# Patient Record
Sex: FEMALE | Race: WHITE | NOT HISPANIC OR LATINO | Employment: OTHER | URBAN - METROPOLITAN AREA
[De-identification: names, ages, dates, MRNs, and addresses within clinical notes are randomized per-mention and may not be internally consistent; named-entity substitution may affect disease eponyms.]

---

## 2017-01-20 ENCOUNTER — HOSPITAL ENCOUNTER (OUTPATIENT)
Dept: RADIOLOGY | Facility: HOSPITAL | Age: 62
Discharge: HOME | End: 2017-01-20
Attending: THORACIC SURGERY (CARDIOTHORACIC VASCULAR SURGERY)
Payer: COMMERCIAL

## 2017-01-20 ENCOUNTER — OFFICE VISIT (OUTPATIENT)
Dept: CARDIOTHORACIC SURGERY | Facility: CLINIC | Age: 62
End: 2017-01-20
Payer: COMMERCIAL

## 2017-01-20 VITALS
WEIGHT: 138.69 LBS | HEART RATE: 120 BPM | BODY MASS INDEX: 25.52 KG/M2 | SYSTOLIC BLOOD PRESSURE: 134 MMHG | DIASTOLIC BLOOD PRESSURE: 78 MMHG | OXYGEN SATURATION: 97 % | HEIGHT: 62 IN

## 2017-01-20 DIAGNOSIS — J90 PLEURAL EFFUSION: ICD-10-CM

## 2017-01-20 DIAGNOSIS — J90 PLEURAL EFFUSION ON RIGHT: Primary | ICD-10-CM

## 2017-01-20 PROCEDURE — 71020 XR CHEST PA AND LATERAL: CPT | Mod: TC

## 2017-01-20 PROCEDURE — 71020 XR CHEST PA AND LATERAL: CPT | Mod: 26,,, | Performed by: RADIOLOGY

## 2017-01-20 PROCEDURE — 3075F SYST BP GE 130 - 139MM HG: CPT | Mod: S$GLB,,, | Performed by: THORACIC SURGERY (CARDIOTHORACIC VASCULAR SURGERY)

## 2017-01-20 PROCEDURE — 99213 OFFICE O/P EST LOW 20 MIN: CPT | Mod: S$GLB,,, | Performed by: THORACIC SURGERY (CARDIOTHORACIC VASCULAR SURGERY)

## 2017-01-20 PROCEDURE — 3078F DIAST BP <80 MM HG: CPT | Mod: S$GLB,,, | Performed by: THORACIC SURGERY (CARDIOTHORACIC VASCULAR SURGERY)

## 2017-01-20 PROCEDURE — 1159F MED LIST DOCD IN RCRD: CPT | Mod: S$GLB,,, | Performed by: THORACIC SURGERY (CARDIOTHORACIC VASCULAR SURGERY)

## 2017-01-20 PROCEDURE — 99999 PR PBB SHADOW E&M-EST. PATIENT-LVL III: CPT | Mod: PBBFAC,,, | Performed by: THORACIC SURGERY (CARDIOTHORACIC VASCULAR SURGERY)

## 2017-01-20 NOTE — PROGRESS NOTES
GENERAL SURGERY CLINIC NOTE    Rehana Rushing is a 62 y.o.  female with Hx of ovarian cancer who presents to clinic for follow up 1 month after pleurx catheter removal.  She denies any SOB but does complain of abdominal pain/distention.  She has difficulty eating and has had some nausea/vomiting. She follows up with Dr Weiss in mid February for her ovarian cancer.        ROS:  A 10-point review of systems is negative except for the above mentioned in the HPI.      Past Medical History   Diagnosis Date    Acute constipation 8/13/2015    Chemotherapy induced neutropenia 9/4/2015    Chemotherapy induced thrombocytopenia 9/4/2015    Cough 10/21/2016    Gastroesophageal reflux disease without esophagitis 2/4/2016    Hepatic cyst 10/21/2016    History of anemia 2008    History of blood transfusion 2008    History of ovarian cancer March 2008     s/p total hyst, chemo    Hypertension     Ovarian cancer 7/14/2015    Pleural effusion, right 10/21/2016       Past Surgical History   Procedure Laterality Date    Tonsillectomy      Tubal ligation       chemo therapy cancer      Hysterectomy  March 2008    Pr removal of ovary/tube(s)  March 2008    Pelvic and para-aortic lymph node dissection  March 2008       Social History     Social History    Marital status:      Spouse name: N/A    Number of children: 2    Years of education: N/A     Occupational History     [employer]     Social History Main Topics    Smoking status: Never Smoker    Smokeless tobacco: Not on file    Alcohol use No    Drug use: No    Sexual activity: Yes     Partners: Male     Other Topics Concern    Not on file     Social History Narrative       Review of patient's allergies indicates:   Allergen Reactions    Carboplatin Itching     Mouth swelling   Hand swelling         PHYSICAL EXAM:  Vitals:    01/20/17 0929   BP: 134/78   Pulse: (!) 120       General: NAD  Neuro:  AAOx3  Cardio: S1 and S2, RRR  Resp: CTAB, breathing even and unlabored  Abd: Soft, distended, non-tender, +BS  Ext: Warm and well perfused      PERTINENT IMAGING:  CXR reviewed     ASSESSMENT/PLAN:  62 y.o. female with hx of ovarian cancer and recurrent pleural effusion s/p pleurx placement and pleurx removal 1 month ago    - Will message Dr Weiss that she may need to be seen sooner for possible paracentesis  - Follow up PRGISELLA Pugh M.D.  General Surgery PGY1  [phone]      ATTENDING ATTESTATION:    I evaluated the patient and I agree with the assessment and plan

## 2017-01-27 ENCOUNTER — OFFICE VISIT (OUTPATIENT)
Dept: GYNECOLOGIC ONCOLOGY | Facility: CLINIC | Age: 62
End: 2017-01-27
Payer: COMMERCIAL

## 2017-01-27 ENCOUNTER — LAB VISIT (OUTPATIENT)
Dept: LAB | Facility: HOSPITAL | Age: 62
End: 2017-01-27
Attending: OBSTETRICS & GYNECOLOGY
Payer: COMMERCIAL

## 2017-01-27 VITALS
SYSTOLIC BLOOD PRESSURE: 127 MMHG | WEIGHT: 136 LBS | BODY MASS INDEX: 24.87 KG/M2 | DIASTOLIC BLOOD PRESSURE: 69 MMHG | HEART RATE: 102 BPM

## 2017-01-27 DIAGNOSIS — C56.9 OVARIAN CANCER, UNSPECIFIED LATERALITY: Primary | ICD-10-CM

## 2017-01-27 DIAGNOSIS — R18.0 ASCITES, MALIGNANT: ICD-10-CM

## 2017-01-27 DIAGNOSIS — C56.9 OVARIAN CANCER, UNSPECIFIED LATERALITY: ICD-10-CM

## 2017-01-27 LAB
ANION GAP SERPL CALC-SCNC: 10 MMOL/L
BUN SERPL-MCNC: 13 MG/DL
CALCIUM SERPL-MCNC: 8.9 MG/DL
CANCER AG125 SERPL-ACNC: 1613 U/ML
CHLORIDE SERPL-SCNC: 103 MMOL/L
CO2 SERPL-SCNC: 23 MMOL/L
CREAT SERPL-MCNC: 0.9 MG/DL
EST. GFR  (AFRICAN AMERICAN): >60 ML/MIN/1.73 M^2
EST. GFR  (NON AFRICAN AMERICAN): >60 ML/MIN/1.73 M^2
GLUCOSE SERPL-MCNC: 142 MG/DL
POTASSIUM SERPL-SCNC: 4.7 MMOL/L
SODIUM SERPL-SCNC: 136 MMOL/L

## 2017-01-27 PROCEDURE — 1159F MED LIST DOCD IN RCRD: CPT | Mod: S$GLB,,, | Performed by: OBSTETRICS & GYNECOLOGY

## 2017-01-27 PROCEDURE — 36415 COLL VENOUS BLD VENIPUNCTURE: CPT

## 2017-01-27 PROCEDURE — 80048 BASIC METABOLIC PNL TOTAL CA: CPT

## 2017-01-27 PROCEDURE — 3074F SYST BP LT 130 MM HG: CPT | Mod: S$GLB,,, | Performed by: OBSTETRICS & GYNECOLOGY

## 2017-01-27 PROCEDURE — 99215 OFFICE O/P EST HI 40 MIN: CPT | Mod: S$GLB,,, | Performed by: OBSTETRICS & GYNECOLOGY

## 2017-01-27 PROCEDURE — 86304 IMMUNOASSAY TUMOR CA 125: CPT

## 2017-01-27 PROCEDURE — 99999 PR PBB SHADOW E&M-EST. PATIENT-LVL III: CPT | Mod: PBBFAC,,, | Performed by: OBSTETRICS & GYNECOLOGY

## 2017-01-27 PROCEDURE — 3078F DIAST BP <80 MM HG: CPT | Mod: S$GLB,,, | Performed by: OBSTETRICS & GYNECOLOGY

## 2017-01-27 NOTE — MR AVS SNAPSHOT
[facility] - GYN Oncology  [address]  [facility] [address]  Phone: [phone]                  Rehana Rushing   2017 2:00 PM   Office Visit    Description:  Female : 1955   Provider:  Nathaniel Weiss MD   Department:  [facility] - GYN Oncology           Reason for Visit     Ovarian Cancer           Diagnoses this Visit        Comments    Ovarian cancer, unspecified laterality    -  Primary     Ascites, malignant                To Do List           Goals (5 Years of Data)     None      [facility] On Call     [facility] On Call Nurse Care Line -  Assistance  Registered nurses in the [facility] On Call Center provide clinical advisement, health education, appointment booking, and other advisory services.  Call for this free service at [phone].             Medications           Message regarding Medications     Verify the changes and/or additions to your medication regime listed below are the same as discussed with your clinician today.  If any of these changes or additions are incorrect, please notify your healthcare provider.        STOP taking these medications     oxycodone-acetaminophen (PERCOCET) 5-325 mg per tablet Take 1 tablet by mouth every 4 (four) hours as needed for Pain.           Verify that the below list of medications is an accurate representation of the medications you are currently taking.  If none reported, the list may be blank. If incorrect, please contact your healthcare provider. Carry this list with you in case of emergency.           Current Medications     acetaminophen (TYLENOL) 325 MG tablet Take 2 tablets (650 mg total) by mouth every 4 (four) hours as needed for Pain (pain).    amlodipine (NORVASC) 5 MG tablet Take 1 tablet (5 mg total) by mouth once daily.    calcium carbonate 220 mg capsule Take by mouth with lunch.     chlorpheniramine (CHLOR-TRIMETON) 4 mg tablet Take 4 mg by mouth. Take 3 tablets day after chemo    FERROUS SULFATE, DRIED (IRON, DRIED,  ORAL) Take 1 tablet by mouth every morning.     GLUTAMINE ORAL Take 2 capsules by mouth 2 (two) times daily. With chemo    losartan (COZAAR) 50 MG tablet Take 1 tablet (50 mg total) by mouth once daily.    multivitamin capsule Take 1 capsule by mouth with lunch.     polyethylene glycol (GLYCOLAX) 17 gram/dose powder Take 17 g by mouth once daily.    tamoxifen (NOLVADEX) 20 MG Tab Take 1 tablet (20 mg total) by mouth once daily.           Clinical Reference Information           Vital Signs - Last Recorded  Most recent update: 1/27/2017  1:38 PM by Jose Francisco Menjivar MA    BP Pulse Wt BMI       127/69 102 61.7 kg (136 lb) 24.87 kg/m2       Blood Pressure          Most Recent Value    BP  127/69      Allergies as of 1/27/2017     Carboplatin      Immunizations Administered on Date of Encounter - 1/27/2017     None      Orders Placed During Today's Visit     Future Labs/Procedures Expected by Expires    Basic metabolic panel  1/27/2017 1/27/2018    CT Abdomen Pelvis With Contrast  1/27/2017 1/27/2018

## 2017-01-27 NOTE — PROGRESS NOTES
Subjective:       Patient ID: Rehana Rushing is a 62 y.o. female.    Chief Complaint: Ovarian Cancer (2mth f/u)    HPI   Patient comes in today for follow up for recurrent ovarian cancer.   She is s/p VATS procedure and placement of Pleur-X catheter Nov 28/2016. Catheter was removed 1 month later with CXR showing only a small right pleural effusion.     She complains of increasing abdominal distension for 1 week.   Had nausea and vomiting last week.   Decreased appetite.      Her oncologic history is:    She was optimally debulked with no gross residual disease. She completed 6    cycles of IV Taxol and carboplatinum in 7/2008.    June 2015 at time of WWE her  was 820. Repeat was 839. CT scan done that showed no evidence for disease.    PET scan showed:    Peritoneal implants over the dome of the liver and posterior to the liver.  Metastatic right diaphragmatic lymph node.      Started Taxol and carboplatin in July 2015.    She had a reaction with carboplatin at the end of her 3rd treatment. Itching hands and swollen lip. Given solu-cortef and resolved.    With cycle 4 she was given additional solu-cortef pre infusion and had similar reaction near the end of infusion. She was given additional solu-cortef and redness of palms of hands and itching resolved.     has gone from 839 to 92 after 3 cycles.    Cycle 5 was cancelled.    PET scan done after 5 cycles of reinduction chemotherapy and this showed    There is physiologic intracranial, head, and neck activity. Heart and mediastinum are normal. The peritoneal implants have resolved. There is a large cystic lesion of the right lobe of the liver. There is physiologic GI and  activity. There is no evidence of peritoneal spread. There is no evidence of active malignancy. Lungs are clear.          Ca 125 after 5 cycles was 72.    At time of cycle 6, her  was 78. Cycle 6 was not given because of a carboplatin allergy. Chemotherapy was discontinued at  this time.    When seen Jan 26, 2015 her  had increased to 349.    PET scan obtained in Jan 2016 showed:    Peritoneal spread of neoplasm around the right lobe of the liver and within the pelvis.  Right pleural metastases.    She started gemcitabine and bevacizumab in Feb 2016. She did not receive bevacizumab with cycle 1 because of her BP. She was started on Norvasc. Her BP was inconsistent and she saw her PCP who added Cozaar. Her BPs at home have been much better.            After 1 cycle of Gemcitabine and 4 cycles of Harding and Avastin, her  has increased from 626 to 697.    At time of starting gem/avastin her  was 459.      CT scan in June 2016 showed:  Stable soft tissue thickening along the posterior right hepatic lobe consistent with patient's known history of peritoneal metastasis.  No new peritoneal metastases.      Because of rising  she was switched to Doxil. Started Doxil on July 1, 2016.  was 697.                  Review of Systems   Constitutional: Positive for fatigue. Negative for chills and fever.   Respiratory: Negative for cough, shortness of breath and wheezing.    Cardiovascular: Negative for chest pain, palpitations and leg swelling.   Gastrointestinal: Positive for abdominal distention, abdominal pain and vomiting. Negative for constipation, diarrhea and nausea.   Genitourinary: Negative for difficulty urinating, dysuria, frequency, genital sores, hematuria, urgency, vaginal bleeding, vaginal discharge and vaginal pain.   Musculoskeletal: Positive for back pain.   Neurological: Negative for weakness.   Hematological: Negative for adenopathy. Does not bruise/bleed easily.   Psychiatric/Behavioral: The patient is not nervous/anxious.        Objective:       Visit Vitals    /69    Pulse 102    Wt 61.7 kg (136 lb)    BMI 24.87 kg/m2       Physical Exam   Constitutional: She is oriented to person, place, and time. She appears well-developed and well-nourished.    HENT:   Head: Normocephalic and atraumatic.   Eyes: No scleral icterus.   Neck: Neck supple. No tracheal deviation present. No thyroid mass and no thyromegaly present.   Cardiovascular: Normal rate and regular rhythm.    Pulmonary/Chest: Effort normal and breath sounds normal. She has no wheezes.   Abdominal: Soft. She exhibits distension (due to ascites. ). She exhibits no mass. There is no hepatosplenomegaly. There is no tenderness. There is no rebound and no guarding.   Genitourinary:   Genitourinary Comments: Bimanual exam:  Vulva: no lesions. Normal appearance  Urethra: Normal size and location. No lesions  Bladder: No masses or tenderness.  Vagina: normal mucosa. No lesion  Cervix: absent.   Uterus: absent.  Adnexa: no masses.  Rectovaginal: Not performed     Musculoskeletal: She exhibits no edema or tenderness.   Lymphadenopathy:     She has no cervical adenopathy.     She has no axillary adenopathy.        Right: No inguinal and no supraclavicular adenopathy present.        Left: No inguinal and no supraclavicular adenopathy present.   Neurological: She is alert and oriented to person, place, and time.   Skin: Skin is warm and dry.   Psychiatric: She has a normal mood and affect. Her behavior is normal. Judgment and thought content normal.       Assessment:       1. Ovarian cancer, unspecified laterality    2. Ascites, malignant        Plan:   Ovarian cancer, unspecified laterality  Will get CT scan to see if patient has measurable disease.   She may be eligible for a clinical trial.     I discussed clinical trial with her. She is not certain she would want to do this.   I offered single agent weekly taxol as an alternative.     I also offer paracentesis but explained that the ascites will reaccumulate within a short period of time.     I also discussed with her and her significant other the option of palliative care.        -     Basic metabolic panel; Future; Expected date: 1/27/17  -     CT Abdomen  Pelvis With Contrast; Future; Expected date: 1/27/17    Ascites, malignant

## 2017-01-31 ENCOUNTER — HOSPITAL ENCOUNTER (OUTPATIENT)
Dept: RADIOLOGY | Facility: HOSPITAL | Age: 62
Discharge: HOME | End: 2017-01-31
Attending: OBSTETRICS & GYNECOLOGY
Payer: COMMERCIAL

## 2017-01-31 DIAGNOSIS — C56.9 OVARIAN CANCER, UNSPECIFIED LATERALITY: ICD-10-CM

## 2017-01-31 PROCEDURE — 74177 CT ABD & PELVIS W/CONTRAST: CPT | Mod: 26,,, | Performed by: RADIOLOGY

## 2017-01-31 PROCEDURE — 25500020 PHARM REV CODE 255: Performed by: OBSTETRICS & GYNECOLOGY

## 2017-01-31 PROCEDURE — 74177 CT ABD & PELVIS W/CONTRAST: CPT | Mod: TC

## 2017-01-31 RX ADMIN — IOHEXOL 75 ML: 350 INJECTION, SOLUTION INTRAVENOUS at 07:01

## 2017-02-02 ENCOUNTER — TELEPHONE (OUTPATIENT)
Dept: GYNECOLOGIC ONCOLOGY | Facility: CLINIC | Age: 62
End: 2017-02-02

## 2017-02-02 NOTE — TELEPHONE ENCOUNTER
----- Message from Nathaniel Weiss MD sent at 2/2/2017  3:15 PM CST -----  I discussed CT scan findings with her.   I discussed with her weekly taxol vs palliative care.   She does not think she wants chemotherapy. She will talk with her significant other about what she wants to do.     I also offered a peritoneal catheter placement for palliation of her abdominal discomfort from ascites.     She will get back in touch with me.

## 2017-02-08 ENCOUNTER — TELEPHONE (OUTPATIENT)
Dept: INTERVENTIONAL RADIOLOGY/VASCULAR | Facility: HOSPITAL | Age: 62
End: 2017-02-08

## 2017-02-08 ENCOUNTER — DOCUMENTATION ONLY (OUTPATIENT)
Dept: HEMATOLOGY/ONCOLOGY | Facility: CLINIC | Age: 62
End: 2017-02-08

## 2017-02-08 DIAGNOSIS — C56.9 OVARIAN CANCER, UNSPECIFIED LATERALITY: Primary | ICD-10-CM

## 2017-02-08 NOTE — PROGRESS NOTES
Received referral from the clinic that patient is in need of hospice services. Patient to have drain placed tomorrow. Contacted patient and discussed services with her. She has very good support at home and believes she can be managed at home. Reviewed in network providers with her and she did not stated preference. Referral made to [facility].

## 2017-02-09 ENCOUNTER — HOSPITAL ENCOUNTER (OUTPATIENT)
Facility: HOSPITAL | Age: 62
Discharge: HOME | End: 2017-02-09
Attending: OBSTETRICS & GYNECOLOGY | Admitting: OBSTETRICS & GYNECOLOGY
Payer: COMMERCIAL

## 2017-02-09 VITALS
RESPIRATION RATE: 18 BRPM | OXYGEN SATURATION: 100 % | HEART RATE: 97 BPM | DIASTOLIC BLOOD PRESSURE: 80 MMHG | TEMPERATURE: 98 F | HEIGHT: 62 IN | BODY MASS INDEX: 25.03 KG/M2 | WEIGHT: 136 LBS | SYSTOLIC BLOOD PRESSURE: 127 MMHG

## 2017-02-09 DIAGNOSIS — C56.9 OVARIAN CANCER, UNSPECIFIED LATERALITY: ICD-10-CM

## 2017-02-09 LAB
INR PPP: 1.3
PROTHROMBIN TIME: 13 SEC

## 2017-02-09 PROCEDURE — 25000003 PHARM REV CODE 250: Performed by: OBSTETRICS & GYNECOLOGY

## 2017-02-09 PROCEDURE — 85610 PROTHROMBIN TIME: CPT

## 2017-02-09 PROCEDURE — 63600175 PHARM REV CODE 636 W HCPCS: Performed by: RADIOLOGY

## 2017-02-09 RX ORDER — SODIUM CHLORIDE 9 MG/ML
INJECTION, SOLUTION INTRAVENOUS CONTINUOUS
Status: DISCONTINUED | OUTPATIENT
Start: 2017-02-09 | End: 2017-02-10 | Stop reason: HOSPADM

## 2017-02-09 RX ORDER — MIDAZOLAM HYDROCHLORIDE 1 MG/ML
INJECTION, SOLUTION INTRAMUSCULAR; INTRAVENOUS CODE/TRAUMA/SEDATION MEDICATION
Status: COMPLETED | OUTPATIENT
Start: 2017-02-09 | End: 2017-02-09

## 2017-02-09 RX ORDER — FENTANYL CITRATE 50 UG/ML
INJECTION, SOLUTION INTRAMUSCULAR; INTRAVENOUS CODE/TRAUMA/SEDATION MEDICATION
Status: COMPLETED | OUTPATIENT
Start: 2017-02-09 | End: 2017-02-09

## 2017-02-09 RX ORDER — LIDOCAINE HYDROCHLORIDE 10 MG/ML
1 INJECTION, SOLUTION EPIDURAL; INFILTRATION; INTRACAUDAL; PERINEURAL ONCE
Status: COMPLETED | OUTPATIENT
Start: 2017-02-09 | End: 2017-02-09

## 2017-02-09 RX ADMIN — MIDAZOLAM HYDROCHLORIDE 0.5 MG: 1 INJECTION, SOLUTION INTRAMUSCULAR; INTRAVENOUS at 03:02

## 2017-02-09 RX ADMIN — FENTANYL CITRATE 25 MCG: 50 INJECTION, SOLUTION INTRAMUSCULAR; INTRAVENOUS at 03:02

## 2017-02-09 RX ADMIN — SODIUM CHLORIDE: 0.9 INJECTION, SOLUTION INTRAVENOUS at 01:02

## 2017-02-09 RX ADMIN — LIDOCAINE HYDROCHLORIDE 0.2 MG: 10 INJECTION, SOLUTION EPIDURAL; INFILTRATION; INTRACAUDAL; PERINEURAL at 01:02

## 2017-02-09 NOTE — PROCEDURES
Radiology Post-Procedure Note    Pre Op Diagnosis: Ovarian cancer with malignant ascites.  Post Op Diagnosis: Same.    Procedure performed by: staff Dr. Darden and resident Dr. Chong.    Written Informed Consent Obtained: Yes  Specimen Removed: YES   Estimated Blood Loss: Minimal    Procedure/Findings:   Image guided tunneled peritoneal pleurx catheter placement (15.5 fr).  Please refer to the procedure dictation for more details.    Patient tolerated procedure well.    Lana Chong  PGY-3  Department of Radiology  [phone]

## 2017-02-09 NOTE — H&P
Radiology History & Physical      SUBJECTIVE:     Chief Complaint: Ovarian ca.    History of Present Illness:  Rehana Rushing is a 62 y.o. female who presents for image guided tunneled peritoneal pleurx catheter placement for malignant ascites.    Past Medical History   Diagnosis Date    Acute constipation 8/13/2015    Ascites, malignant 1/27/2017    Chemotherapy induced neutropenia 9/4/2015    Chemotherapy induced thrombocytopenia 9/4/2015    Cough 10/21/2016    Gastroesophageal reflux disease without esophagitis 2/4/2016    Hepatic cyst 10/21/2016    History of anemia 2008    History of blood transfusion 2008    History of ovarian cancer March 2008     s/p total hyst, chemo    Hypertension     Ovarian cancer 7/14/2015    Pleural effusion, right 10/21/2016     Past Surgical History   Procedure Laterality Date    Tonsillectomy      Tubal ligation       chemo therapy cancer      Hysterectomy  March 2008    Pr removal of ovary/tube(s)  March 2008    Pelvic and para-aortic lymph node dissection  March 2008       Home Meds:   Prior to Admission medications    Medication Sig Start Date End Date Taking? Authorizing Provider   acetaminophen (TYLENOL) 325 MG tablet Take 2 tablets (650 mg total) by mouth every 4 (four) hours as needed for Pain (pain). 11/29/16  Yes WINSTON Montalvo   amlodipine (NORVASC) 5 MG tablet Take 1 tablet (5 mg total) by mouth once daily. 12/6/16 12/6/17 Yes Maria Elena Lpoez MD   losartan (COZAAR) 50 MG tablet Take 1 tablet (50 mg total) by mouth once daily. 12/6/16 12/6/17 Yes Maria Elena Lopez MD   polyethylene glycol (GLYCOLAX) 17 gram/dose powder Take 17 g by mouth once daily.  Patient taking differently: Take 17 g by mouth daily as needed.  8/13/15  Yes Nathaniel Weiss MD   calcium carbonate 220 mg capsule Take by mouth with lunch.     Historical Provider, MD   chlorpheniramine (CHLOR-TRIMETON) 4 mg tablet Take 4 mg by mouth. Take 3 tablets day after chemo     Historical Provider, MD   FERROUS SULFATE, DRIED (IRON, DRIED, ORAL) Take 1 tablet by mouth every morning.     Historical Provider, MD   GLUTAMINE ORAL Take 2 capsules by mouth 2 (two) times daily. With chemo    Historical Provider, MD   multivitamin capsule Take 1 capsule by mouth with lunch.     Historical Provider, MD   tamoxifen (NOLVADEX) 20 MG Tab Take 1 tablet (20 mg total) by mouth once daily. 12/15/16 12/15/17  Nathaniel Weiss MD     Anticoagulants/Antiplatelets: no anticoagulation    Allergies:   Review of patient's allergies indicates:   Allergen Reactions    Carboplatin Itching     Mouth swelling   Hand swelling     Sedation History:  no adverse reactions    Review of Systems:   Hematological: no known coagulopathies  Respiratory: no shortness of breath  Cardiovascular: no chest pain  Gastrointestinal: no abdominal pain  Genito-Urinary: no dysuria  Musculoskeletal: negative  Neurological: no TIA or stroke symptoms         OBJECTIVE:     Vital Signs (Most Recent)  Temp: 98 °F (36.7 °C) (02/09/17 1246)  Pulse: 98 (02/09/17 1246)  Resp: 16 (02/09/17 1246)  BP: 122/77 (02/09/17 1246)  SpO2: 99 % (02/09/17 1246)    Physical Exam:  ASA: 2  Mallampati: 3    General: no acute distress  Mental Status: alert and oriented to person, place and time  HEENT: normocephalic, atraumatic  Chest: unlabored breathing  Heart: regular heart rate  Abdomen: distended  Extremity: moves all extremities    Laboratory  Lab Results   Component Value Date    INR 1.3 (H) 02/09/2017       Lab Results   Component Value Date    WBC 6.67 02/09/2017    HGB 12.0 02/09/2017    HCT 37.8 02/09/2017    MCV 89 02/09/2017     02/09/2017      Lab Results   Component Value Date     (H) 02/09/2017     02/09/2017    K 5.7 (H) 02/09/2017     02/09/2017    CO2 26 02/09/2017    BUN 14 02/09/2017    CREATININE 1.1 02/09/2017    CALCIUM 9.6 02/09/2017    MG 1.7 11/29/2016    ALT 15 11/04/2015    AST 19 11/04/2015    ALBUMIN 4.0  11/04/2015    BILITOT 0.6 11/04/2015       ASSESSMENT/PLAN:     Sedation Plan: moderate.  Patient will undergo image guided tunneled peritoneal pleurx catheter placement.    Lana Chong  PGY-3  Department of Radiology  [phone]

## 2017-02-09 NOTE — PROGRESS NOTES
Pt transported to [facility] via wheelchair by transporter friend to provide transport home.

## 2017-02-09 NOTE — DISCHARGE INSTRUCTIONS
For scheduling: Call Sydney at [phone]    For questions or concerns call: ALEJANDRA MON-FRI 8 AM- 5PM [phone]. Radiology resident on call [phone].    For immediate concerns that are not emergent, you may call our radiology clinic at: [phone]

## 2017-02-09 NOTE — PROGRESS NOTES
Pt given dischage instructions/handouts. Pt and friend both verbalize understanding. No acute events. Pt given all personal items. HUNTER louie'deidra.

## 2017-02-09 NOTE — IP AVS SNAPSHOT
[facility]  [address]  [facility] [address]  Phone: [phone]           Patient Discharge Instructions     Our goal is to set you up for success. This packet includes information on your condition, medications, and your home care. It will help you to care for yourself so you don't get sicker and need to go back to the hospital.     Please ask your nurse if you have any questions.        There are many details to remember when preparing to leave the hospital. Here is what you will need to do:    1. Take your medicine. If you are prescribed medications, review your Medication List in the following pages. You may have new medications to  at the pharmacy and others that you'll need to stop taking. Review the instructions for how and when to take your medications. Talk with your doctor or nurses if you are unsure of what to do.     2. Go to your follow-up appointments. Specific follow-up information is listed in the following pages. Your may be contacted by a transition nurse or clinical provider about future appointments. Be sure we have all of the phone numbers to reach you, if needed. Please contact your provider's office if you are unable to make an appointment.     3. Watch for warning signs. Your doctor or nurse will give you detailed warning signs to watch for and when to call for assistance. These instructions may also include educational information about your condition. If you experience any of warning signs to your health, call your doctor.               [facility]  Unless otherwise directed by your provider, please contact [facility], our nurse care line that is available for 24/7 assistance.     [phone] (toll-free)    Registered nurses in the [facility] provide clinical advisement, health education, appointment booking, and other advisory services.                    ** Verify the list of medication(s) below is accurate and up  to date. Carry this with you in case of emergency. If your medications have changed, please notify your healthcare provider.             Medication List      ASK your doctor about these medications        Additional Info                      acetaminophen 325 MG tablet   Commonly known as:  TYLENOL   Refills:  0   Dose:  650 mg    Instructions:  Take 2 tablets (650 mg total) by mouth every 4 (four) hours as needed for Pain (pain).     Begin Date    AM    Noon    PM    Bedtime       amlodipine 5 MG tablet   Commonly known as:  NORVASC   Quantity:  30 tablet   Refills:  5   Dose:  5 mg    Instructions:  Take 1 tablet (5 mg total) by mouth once daily.     Begin Date    AM    Noon    PM    Bedtime       calcium carbonate 220 mg capsule   Refills:  0    Instructions:  Take by mouth with lunch.     Begin Date    AM    Noon    PM    Bedtime       chlorpheniramine 4 mg tablet   Commonly known as:  CHLOR-TRIMETON   Refills:  0   Dose:  4 mg    Instructions:  Take 4 mg by mouth. Take 3 tablets day after chemo     Begin Date    AM    Noon    PM    Bedtime       GLUTAMINE ORAL   Refills:  0   Dose:  2 capsule    Instructions:  Take 2 capsules by mouth 2 (two) times daily. With chemo     Begin Date    AM    Noon    PM    Bedtime       IRON (DRIED) ORAL   Refills:  0   Dose:  1 tablet    Instructions:  Take 1 tablet by mouth every morning.     Begin Date    AM    Noon    PM    Bedtime       losartan 50 MG tablet   Commonly known as:  COZAAR   Quantity:  30 tablet   Refills:  5   Dose:  50 mg    Instructions:  Take 1 tablet (50 mg total) by mouth once daily.     Begin Date    AM    Noon    PM    Bedtime       multivitamin capsule   Refills:  0   Dose:  1 capsule    Instructions:  Take 1 capsule by mouth with lunch.     Begin Date    AM    Noon    PM    Bedtime       polyethylene glycol 17 gram/dose powder   Commonly known as:  GLYCOLAX   Quantity:  1 Bottle   Refills:  0   Dose:  17 g    Instructions:  Take 17 g by mouth once  "daily.     Begin Date    AM    Noon    PM    Bedtime       tamoxifen 20 MG Tab   Commonly known as:  NOLVADEX   Quantity:  30 tablet   Refills:  11   Dose:  20 mg    Instructions:  Take 1 tablet (20 mg total) by mouth once daily.     Begin Date    AM    Noon    PM    Bedtime                  Please bring to all follow up appointments:    1. A copy of your discharge instructions.  2. All medicines you are currently taking in their original bottles.  3. Identification and insurance card.    Please arrive 15 minutes ahead of scheduled appointment time.    Please call 24 hours in advance if you must reschedule your appointment and/or time.            Discharge Instructions       For scheduling: Call [place] at [phone]    For questions or concerns call: [facility] 8 AM- 5PM [phone]. Radiology resident on call [phone].    For immediate concerns that are not emergent, you may call our radiology clinic at: [phone]    Discharge References/Attachments     SEDATION, PROCEDURAL (ADULT) (ENGLISH)    PARACENTESIS (ENGLISH)        Admission Information     Date & Time Provider Department CSN    2/9/2017  9:27 AM Nathaniel Weiss MD [facility] [id number]      Care Providers     Provider Role Specialty Primary office phone    Nathaniel Weiss MD Attending Provider Gynecologic Oncology [phone]    [facility] Diagnostic Provider Surgeon  -- Number not on file      Your Vitals Were     BP Pulse Temp Resp Height Weight    130/68 (BP Location: Right arm, Patient Position: Lying, BP Method: Automatic) 100 98.1 °F (36.7 °C) (Oral) 18 5' 2" (1.575 m) 61.7 kg (136 lb)    SpO2 BMI             100% 24.87 kg/m2         Recent Lab Values        6/17/2015                           8:34 AM           A1C 5.7                       Allergies as of 2/9/2017        Reactions    Carboplatin Itching    Mouth swelling   Hand swelling      Advance Directives     An advance directive is a document which, in the event you " are no longer able to make decisions for yourself, tells your healthcare team what kind of treatment you do or do not want to receive, or who you would like to make those decisions for you.  If you do not currently have an advance directive, [facility] encourages you to create one.  For more information call:  [phone]), [phone] ([phone]),  or log on to [email or web address].        Language Assistance Services     ATTENTION: Language assistance services are available, free of charge. Please call [phone].      ATENCIÓN: Si habla sarah beth, tiene a garcia disposición servicios gratuitos de asistencia lingüística. Llame al [phone].     CHÚ Ý: N?u b?n nói Ti?ng Vi?t, có các d?ch v? h? tr? ngôn ng? mi?n phí dành cho b?n. G?i s? [phone].         [facility] complies with applicable Federal civil rights laws and does not discriminate on the basis of race, color, national origin, age, disability, or sex.

## 2017-02-10 NOTE — DISCHARGE SUMMARY
Radiology Discharge Summary      Hospital Course: No complications    Admit Date: 2/9/2017  Discharge Date: 2/9/17    Instructions Given to Patient: Yes  Diet: regular diet and Resume prior diet  Activity: activity as tolerated    Description of Condition on Discharge: Stable  Vital Signs (Most Recent): Temp: 98.1 °F (36.7 °C) (02/09/17 1530)  Pulse: 97 (02/09/17 1700)  Resp: 18 (02/09/17 1700)  BP: 127/80 (02/09/17 1700)  SpO2: 100 % (02/09/17 1700)    Discharge Disposition: Home    Discharge Diagnosis: ovarian ca    Mirza Darden MD  Staff Radiologist  Department of Radiology  Pager: [phone]

## 2017-08-25 DIAGNOSIS — Z12.31 OTHER SCREENING MAMMOGRAM: ICD-10-CM
